# Patient Record
Sex: MALE | Race: WHITE
[De-identification: names, ages, dates, MRNs, and addresses within clinical notes are randomized per-mention and may not be internally consistent; named-entity substitution may affect disease eponyms.]

---

## 2021-02-28 ENCOUNTER — HOSPITAL ENCOUNTER (EMERGENCY)
Dept: HOSPITAL 19 - COL.ER | Age: 66
Discharge: HOME | End: 2021-02-28
Attending: FAMILY MEDICINE
Payer: COMMERCIAL

## 2021-02-28 VITALS — SYSTOLIC BLOOD PRESSURE: 131 MMHG | DIASTOLIC BLOOD PRESSURE: 84 MMHG | HEART RATE: 77 BPM

## 2021-02-28 VITALS — HEIGHT: 72.99 IN | BODY MASS INDEX: 31.88 KG/M2 | WEIGHT: 240.52 LBS

## 2021-02-28 VITALS — TEMPERATURE: 97.8 F

## 2021-02-28 DIAGNOSIS — M00.832: Primary | ICD-10-CM

## 2021-02-28 LAB
ALBUMIN SERPL-MCNC: 4 GM/DL (ref 3.5–5)
ALP SERPL-CCNC: 153 U/L (ref 50–136)
ALT SERPL-CCNC: 32 U/L (ref 4–49)
ANION GAP SERPL CALC-SCNC: 9 MMOL/L (ref 7–16)
AST SERPL-CCNC: 30 U/L (ref 15–37)
BASOPHILS # BLD: 0 10*3/UL (ref 0–0.2)
BASOPHILS NFR BLD AUTO: 0.3 % (ref 0–2)
BILIRUB SERPL-MCNC: 1 MG/DL (ref 0–1)
BUN SERPL-MCNC: 26 MG/DL (ref 9–20)
CALCIUM SERPL-MCNC: 9.3 MG/DL (ref 8.4–10.2)
CHLORIDE SERPL-SCNC: 95 MMOL/L (ref 98–107)
CO2 SERPL-SCNC: 28 MMOL/L (ref 22–30)
CREAT SERPL-SCNC: 1 UMOL/L (ref 0.66–1.25)
CRP SERPL-MCNC: 5.2 MG/DL (ref 0–0.9)
EOSINOPHIL # BLD: 0.1 10*3/UL (ref 0–0.7)
EOSINOPHIL NFR BLD: 1.2 % (ref 0–4)
ERYTHROCYTE [DISTWIDTH] IN BLOOD BY AUTOMATED COUNT: 14.2 % (ref 11.5–14.5)
ERYTHROCYTE [SEDIMENTATION RATE] IN BLOOD: 11 MM/HR (ref 0–30)
GLUCOSE SERPL-MCNC: 411 MG/DL (ref 74–106)
GRANULOCYTES # BLD AUTO: 71.2 % (ref 42.2–75.2)
HCT VFR BLD AUTO: 46.5 % (ref 42–52)
HGB BLD-MCNC: 15.9 G/DL (ref 13.5–18)
LYMPHOCYTES # BLD: 1.8 10*3/UL (ref 1.2–3.4)
LYMPHOCYTES NFR BLD: 15.6 % (ref 20–51)
MCH RBC QN AUTO: 29 PG (ref 27–31)
MCHC RBC AUTO-ENTMCNC: 34 G/DL (ref 33–37)
MCV RBC AUTO: 84 FL (ref 80–100)
MONOCYTES # BLD: 1.3 10*3/UL (ref 0.1–0.6)
MONOCYTES NFR BLD AUTO: 11.1 % (ref 1.7–9.3)
NEUTROPHILS # BLD: 8.4 10*3/UL (ref 1.4–6.5)
PLATELET # BLD AUTO: 185 K/MM3 (ref 130–400)
PMV BLD AUTO: 10.5 FL (ref 7.4–10.4)
POTASSIUM SERPL-SCNC: 4.1 MMOL/L (ref 3.4–5)
PROT SERPL-MCNC: 7.1 GM/DL (ref 6.4–8.2)
RBC # BLD AUTO: 5.54 M/MM3 (ref 4.2–5.6)
SODIUM SERPL-SCNC: 132 MMOL/L (ref 137–145)

## 2021-04-15 ENCOUNTER — HOSPITAL ENCOUNTER (OUTPATIENT)
Dept: HOSPITAL 19 - COL.RAD | Age: 66
End: 2021-04-15
Attending: INTERNAL MEDICINE
Payer: COMMERCIAL

## 2021-04-15 DIAGNOSIS — L03.114: ICD-10-CM

## 2021-04-15 DIAGNOSIS — M19.032: Primary | ICD-10-CM

## 2021-04-15 DIAGNOSIS — E11.65: ICD-10-CM

## 2021-04-15 DIAGNOSIS — M18.12: ICD-10-CM

## 2021-04-15 PROCEDURE — A9503 TC99M MEDRONATE: HCPCS

## 2023-03-01 ENCOUNTER — HOSPITAL ENCOUNTER (OUTPATIENT)
Dept: HOSPITAL 19 - SDCO | Age: 68
Discharge: HOME | End: 2023-03-01
Attending: INTERNAL MEDICINE
Payer: COMMERCIAL

## 2023-03-01 VITALS — HEIGHT: 72 IN | BODY MASS INDEX: 32.07 KG/M2 | WEIGHT: 236.78 LBS

## 2023-03-01 VITALS — DIASTOLIC BLOOD PRESSURE: 67 MMHG | SYSTOLIC BLOOD PRESSURE: 117 MMHG | HEART RATE: 76 BPM

## 2023-03-01 VITALS — DIASTOLIC BLOOD PRESSURE: 65 MMHG | HEART RATE: 87 BPM | SYSTOLIC BLOOD PRESSURE: 100 MMHG

## 2023-03-01 VITALS — DIASTOLIC BLOOD PRESSURE: 61 MMHG | TEMPERATURE: 97.9 F | HEART RATE: 75 BPM | SYSTOLIC BLOOD PRESSURE: 102 MMHG

## 2023-03-01 VITALS — SYSTOLIC BLOOD PRESSURE: 129 MMHG | DIASTOLIC BLOOD PRESSURE: 96 MMHG | TEMPERATURE: 97.7 F | HEART RATE: 88 BPM

## 2023-03-01 VITALS — SYSTOLIC BLOOD PRESSURE: 106 MMHG | HEART RATE: 74 BPM | DIASTOLIC BLOOD PRESSURE: 63 MMHG

## 2023-03-01 DIAGNOSIS — K63.5: ICD-10-CM

## 2023-03-01 DIAGNOSIS — Z12.11: Primary | ICD-10-CM

## 2023-03-01 DIAGNOSIS — R19.5: ICD-10-CM

## 2023-03-01 DIAGNOSIS — D12.2: ICD-10-CM

## 2023-03-01 DIAGNOSIS — K57.30: ICD-10-CM

## 2023-03-01 NOTE — NUR
0955- PATIENT RETURNS TO [facility] VIA CART. PT AWAKE AND ALERT. RESPIRATIONS
UNLABORED. AMBULATED TO RECLINER CHAIR WITH 2:1 SBA. PT DENIES NAUSEA OR
ABDOMINAL PAIN. HOOKED UP TO MONITOR AND VS OBTAINED. CALL LIGHT AT SIDE AND
WIFE PRESENT.
 
1002- PATIENT TOLERATING DIET PEPSI X 2 AND MUFFIN X 2 WITHOUT NAUSEA.
 
1017- DR. MCKEON IN ROOM SPEAKING WITH PATIENT.
 
1012- D/C INSTRUCTIONS REVIEWED WITH PATIENT. PT VERBALIZED UNDERSTANDING AND
A COPY OF INSTRUCTIONS PROVIDED IN D/C FOLDER.
 
1037- PATIENT DRESSED SELF.
 
1042- PATIENT DISCHARGED FROM UNIT VIA W/C TO A PERSONAL VEHICLE. PT LEFT
HOSPITAL IN STABLE CONDITION.

## 2023-03-01 NOTE — NUR
ROSY Vidales CRNA was called to notify her of the patient's increased blood sugar.
She verbalized understanding and an order for Novolog 5 units IV now was
obtained. No further orders at this time.

## 2024-10-21 ENCOUNTER — HOSPITAL ENCOUNTER (OUTPATIENT)
Dept: HOSPITAL 19 - DIA.ED | Age: 69
End: 2024-10-21
Attending: FAMILY MEDICINE
Payer: COMMERCIAL

## 2024-10-21 DIAGNOSIS — E11.9: Primary | ICD-10-CM

## 2024-10-21 DIAGNOSIS — E78.5: ICD-10-CM

## 2024-10-21 DIAGNOSIS — Z79.4: ICD-10-CM

## 2024-10-21 PROCEDURE — G0108 DIAB MANAGE TRN  PER INDIV: HCPCS
